# Patient Record
Sex: FEMALE | URBAN - METROPOLITAN AREA
[De-identification: names, ages, dates, MRNs, and addresses within clinical notes are randomized per-mention and may not be internally consistent; named-entity substitution may affect disease eponyms.]

---

## 2018-10-18 ENCOUNTER — OFFICE VISIT (OUTPATIENT)
Dept: URBAN - METROPOLITAN AREA CLINIC 62 | Facility: CLINIC | Age: 73
End: 2018-10-18
Payer: MEDICARE

## 2018-10-18 DIAGNOSIS — H00.12 CHALAZION RIGHT LOWER EYELID: Primary | ICD-10-CM

## 2018-10-18 PROCEDURE — 92012 INTRM OPH EXAM EST PATIENT: CPT | Performed by: OPTOMETRIST

## 2018-10-18 ASSESSMENT — INTRAOCULAR PRESSURE
OD: 15
OS: 13

## 2018-10-18 NOTE — IMPRESSION/PLAN
Impression: Chalazion right lower eyelid: H00.12. Plan: Discussed diagnosis in detail with patient. Reassured patient of current condition and treatment. Patient instructed to apply warm compresses QID for 15 mins for 1 week. Lid scrubs and hygiene were explained. Return to clinic if unresolved.

## 2019-07-29 ENCOUNTER — OFFICE VISIT (OUTPATIENT)
Dept: URBAN - METROPOLITAN AREA CLINIC 62 | Facility: CLINIC | Age: 74
End: 2019-07-29
Payer: MEDICARE

## 2019-07-29 DIAGNOSIS — H52.4 PRESBYOPIA: ICD-10-CM

## 2019-07-29 DIAGNOSIS — H43.813 VITREOUS DEGENERATION, BILATERAL: ICD-10-CM

## 2019-07-29 PROCEDURE — 92014 COMPRE OPH EXAM EST PT 1/>: CPT | Performed by: OPTOMETRIST

## 2019-07-29 ASSESSMENT — KERATOMETRY
OD: 41.38
OS: 41.38

## 2019-07-29 ASSESSMENT — INTRAOCULAR PRESSURE
OS: 15
OD: 15

## 2019-07-29 ASSESSMENT — VISUAL ACUITY: OS: 20/30

## 2020-11-06 ENCOUNTER — OFFICE VISIT (OUTPATIENT)
Dept: URBAN - METROPOLITAN AREA CLINIC 62 | Facility: CLINIC | Age: 75
End: 2020-11-06
Payer: MEDICARE

## 2020-11-06 DIAGNOSIS — H25.813 COMBINED FORMS OF AGE-RELATED CATARACT, BILATERAL: Primary | ICD-10-CM

## 2020-11-06 PROCEDURE — 92014 COMPRE OPH EXAM EST PT 1/>: CPT | Performed by: OPTOMETRIST

## 2020-11-06 ASSESSMENT — VISUAL ACUITY
OS: 20/30
OD: 20/30

## 2020-11-06 ASSESSMENT — KERATOMETRY
OS: 41.63
OD: 41.50

## 2020-11-06 NOTE — IMPRESSION/PLAN
Impression: Combined forms of age-related cataract, bilateral: H25.813. Plan: Cataracts account for the patient's complaints. Discussed all risks, benefits, procedures and recovery. Patient understands changing glasses will not improve vision. Patient desires to have surgery and referred to Dr. Peggy Barragan for phacoemulsification with intraocular lens.

## 2020-11-11 ENCOUNTER — OFFICE VISIT (OUTPATIENT)
Dept: URBAN - METROPOLITAN AREA CLINIC 62 | Facility: CLINIC | Age: 75
End: 2020-11-11
Payer: MEDICARE

## 2020-11-11 PROCEDURE — 99204 OFFICE O/P NEW MOD 45 MIN: CPT | Performed by: OPHTHALMOLOGY

## 2020-11-11 ASSESSMENT — KERATOMETRY
OS: 41.13
OD: 41.50

## 2020-11-11 ASSESSMENT — INTRAOCULAR PRESSURE
OS: 15
OD: 16

## 2020-11-11 NOTE — IMPRESSION/PLAN
Impression: Combined forms of age-related cataract, bilateral: H25.813. Plan: Cataract accounts for pt complaints. Pt desires sx. Schedule CE/IOL both eyes, left then right. Risk/Benefits/Alternatives discussed with patient. Rec. mono-focal vs Toric. Consider ORA/LRI. RL2. Target distance. Combination drops. Order a-scan. Rec. Intra-ocular cefuroxime to decrease the risk of endophthalmitis.

## 2020-11-18 ENCOUNTER — TESTING ONLY (OUTPATIENT)
Dept: URBAN - METROPOLITAN AREA CLINIC 62 | Facility: CLINIC | Age: 75
End: 2020-11-18
Payer: MEDICARE

## 2020-11-18 ASSESSMENT — PACHYMETRY
OD: 25.70
OS: 25.67
OS: 3.67
OD: 3.38

## 2020-12-10 ENCOUNTER — SURGERY (OUTPATIENT)
Dept: URBAN - METROPOLITAN AREA SURGERY 40 | Facility: SURGERY | Age: 75
End: 2020-12-10
Payer: MEDICARE

## 2020-12-10 ENCOUNTER — Encounter (OUTPATIENT)
Dept: URBAN - METROPOLITAN AREA SURGERY 39 | Facility: SURGERY | Age: 75
End: 2020-12-10
Payer: MEDICARE

## 2020-12-10 PROCEDURE — G8918 PT W/O PREOP ORDER IV AB PRO: HCPCS | Performed by: CLINIC/CENTER

## 2020-12-10 PROCEDURE — G8907 PT DOC NO EVENTS ON DISCHARG: HCPCS | Performed by: CLINIC/CENTER

## 2020-12-10 PROCEDURE — 66984 XCAPSL CTRC RMVL W/O ECP: CPT | Performed by: OPHTHALMOLOGY

## 2020-12-11 ENCOUNTER — POST-OPERATIVE VISIT (OUTPATIENT)
Dept: URBAN - METROPOLITAN AREA CLINIC 62 | Facility: CLINIC | Age: 75
End: 2020-12-11
Payer: MEDICARE

## 2020-12-11 DIAGNOSIS — Z48.810 ENCOUNTER FOR SURGICAL AFTERCARE FOLLOWING SURGERY ON THE SENSE ORGANS: Primary | ICD-10-CM

## 2020-12-11 PROCEDURE — 99024 POSTOP FOLLOW-UP VISIT: CPT | Performed by: OPTOMETRIST

## 2020-12-11 ASSESSMENT — INTRAOCULAR PRESSURE: OD: 21

## 2020-12-11 NOTE — IMPRESSION/PLAN
Impression: S/P Cataract Extraction by phacoemulsification with IOL placement; ORA/LRI OD - 1 Day. Encounter for surgical aftercare following surgery on a sense organ  Z48.810. Plan: RTC 2nd eye surgery OS RL2.  Combo Qid Od

## 2020-12-16 ENCOUNTER — Encounter (OUTPATIENT)
Dept: URBAN - METROPOLITAN AREA SURGERY 39 | Facility: SURGERY | Age: 75
End: 2020-12-16
Payer: MEDICARE

## 2020-12-16 ENCOUNTER — SURGERY (OUTPATIENT)
Dept: URBAN - METROPOLITAN AREA SURGERY 40 | Facility: SURGERY | Age: 75
End: 2020-12-16
Payer: MEDICARE

## 2020-12-16 PROCEDURE — G8918 PT W/O PREOP ORDER IV AB PRO: HCPCS | Performed by: CLINIC/CENTER

## 2020-12-16 PROCEDURE — G8907 PT DOC NO EVENTS ON DISCHARG: HCPCS | Performed by: CLINIC/CENTER

## 2020-12-16 PROCEDURE — 66984 XCAPSL CTRC RMVL W/O ECP: CPT | Performed by: OPHTHALMOLOGY

## 2020-12-17 ENCOUNTER — POST-OPERATIVE VISIT (OUTPATIENT)
Dept: URBAN - METROPOLITAN AREA CLINIC 62 | Facility: CLINIC | Age: 75
End: 2020-12-17
Payer: MEDICARE

## 2020-12-17 PROCEDURE — 99024 POSTOP FOLLOW-UP VISIT: CPT | Performed by: OPTOMETRIST

## 2020-12-17 ASSESSMENT — INTRAOCULAR PRESSURE
OD: 18
OS: 22

## 2020-12-17 NOTE — IMPRESSION/PLAN
Impression:  Presence of intraocular lens  Z96.1.  Plan: RTC 1 week PO2 Combination drops 4xs a day on OS eye and 3xs a day on OS

## 2020-12-23 ENCOUNTER — POST-OPERATIVE VISIT (OUTPATIENT)
Dept: URBAN - METROPOLITAN AREA CLINIC 62 | Facility: CLINIC | Age: 75
End: 2020-12-23
Payer: MEDICARE

## 2020-12-23 DIAGNOSIS — Z96.1 PRESENCE OF INTRAOCULAR LENS: Primary | ICD-10-CM

## 2020-12-23 PROCEDURE — 99024 POSTOP FOLLOW-UP VISIT: CPT | Performed by: OPTOMETRIST

## 2020-12-23 ASSESSMENT — VISUAL ACUITY
OD: 20/25
OS: 20/25

## 2020-12-23 ASSESSMENT — INTRAOCULAR PRESSURE
OS: 16
OD: 16

## 2020-12-23 NOTE — IMPRESSION/PLAN
Impression: S/P Cataract Extraction by phacoemulsification with IOL placement; ORA/LRI OS - 7 Days. Presence of intraocular lens  Z96.1.  Excellent post op course   Post operative instructions reviewed - Plan: RTC 3 week PO3 OS combo drops TID OS and BID OD

## 2021-01-15 ENCOUNTER — POST-OPERATIVE VISIT (OUTPATIENT)
Dept: URBAN - METROPOLITAN AREA CLINIC 62 | Facility: CLINIC | Age: 76
End: 2021-01-15
Payer: MEDICARE

## 2021-01-15 PROCEDURE — 99024 POSTOP FOLLOW-UP VISIT: CPT | Performed by: OPTOMETRIST

## 2021-01-15 ASSESSMENT — VISUAL ACUITY
OD: 20/20
OS: 20/20

## 2021-01-15 ASSESSMENT — INTRAOCULAR PRESSURE
OS: 11
OD: 11

## 2021-01-15 NOTE — IMPRESSION/PLAN
Impression: S/P Cataract Extraction by phacoemulsification with IOL placement; ORA/LRI OS - 30 Days. Presence of intraocular lens  Z96.1. Plan: Manifest done today.  finished all drops as prescribed

## 2021-06-03 ENCOUNTER — OFFICE VISIT (OUTPATIENT)
Dept: URBAN - METROPOLITAN AREA CLINIC 63 | Facility: CLINIC | Age: 76
End: 2021-06-03
Payer: MEDICARE

## 2021-06-03 DIAGNOSIS — H04.123 DRY EYE SYNDROME OF BILATERAL LACRIMAL GLANDS: Primary | Chronic | ICD-10-CM

## 2021-06-03 PROCEDURE — 92014 COMPRE OPH EXAM EST PT 1/>: CPT | Performed by: OPTOMETRIST

## 2021-06-03 ASSESSMENT — INTRAOCULAR PRESSURE
OD: 13
OS: 13

## 2022-06-24 ENCOUNTER — OFFICE VISIT (OUTPATIENT)
Dept: URBAN - METROPOLITAN AREA CLINIC 63 | Facility: CLINIC | Age: 77
End: 2022-06-24
Payer: MEDICARE

## 2022-06-24 DIAGNOSIS — H04.123 DRY EYE SYNDROME OF BILATERAL LACRIMAL GLANDS: Primary | ICD-10-CM

## 2022-06-24 DIAGNOSIS — H43.813 VITREOUS DEGENERATION, BILATERAL: ICD-10-CM

## 2022-06-24 DIAGNOSIS — H26.492 OTHER SECONDARY CATARACT, LEFT EYE: ICD-10-CM

## 2022-06-24 DIAGNOSIS — Z96.1 PRESENCE OF PSEUDOPHAKIA: ICD-10-CM

## 2022-06-24 DIAGNOSIS — H52.4 PRESBYOPIA: ICD-10-CM

## 2022-06-24 PROCEDURE — 92014 COMPRE OPH EXAM EST PT 1/>: CPT | Performed by: OPTOMETRIST

## 2022-06-24 ASSESSMENT — VISUAL ACUITY
OS: 20/20
OD: 20/25

## 2022-06-24 ASSESSMENT — INTRAOCULAR PRESSURE
OD: 12
OS: 15

## 2022-06-24 ASSESSMENT — KERATOMETRY
OD: 41.75
OS: 41.50

## 2023-07-13 ENCOUNTER — OFFICE VISIT (OUTPATIENT)
Dept: URBAN - METROPOLITAN AREA CLINIC 63 | Facility: CLINIC | Age: 78
End: 2023-07-13
Payer: MEDICARE

## 2023-07-13 DIAGNOSIS — H43.813 VITREOUS DEGENERATION, BILATERAL: ICD-10-CM

## 2023-07-13 DIAGNOSIS — H04.123 DRY EYE SYNDROME OF BILATERAL LACRIMAL GLANDS: Primary | ICD-10-CM

## 2023-07-13 DIAGNOSIS — H26.492 OTHER SECONDARY CATARACT, LEFT EYE: ICD-10-CM

## 2023-07-13 DIAGNOSIS — Z96.1 PRESENCE OF PSEUDOPHAKIA: ICD-10-CM

## 2023-07-13 DIAGNOSIS — H52.13 MYOPIA, BILATERAL: ICD-10-CM

## 2023-07-13 DIAGNOSIS — H52.4 PRESBYOPIA: ICD-10-CM

## 2023-07-13 PROCEDURE — 92014 COMPRE OPH EXAM EST PT 1/>: CPT | Performed by: OPTOMETRIST

## 2023-07-13 ASSESSMENT — KERATOMETRY
OD: 41.38
OS: 41.50

## 2023-07-13 ASSESSMENT — INTRAOCULAR PRESSURE
OD: 14
OS: 14

## 2023-07-13 ASSESSMENT — VISUAL ACUITY
OD: 20/25
OS: 20/25

## 2023-07-13 NOTE — IMPRESSION/PLAN
Impression: Presbyopia: H52.4. Plan: Give Rx for PAL glass and instructed on normal adaptation period.

## 2023-08-20 NOTE — IMPRESSION/PLAN
Impression: Vitreous degeneration, bilateral: H43.813. Plan: Posterior vitreous detachment accounts for the patient's complaints. There is no evidence of retinal pathology. All signs and risks of retinal detachment and tears were discussed in detail. The possibility of vitreoretinal traction was explained and patient instructed to call the office immediately if any new symptoms noted or symptoms change. [Regular Cycle Intervals] : periods have been irregular [Menarche Age: ____] : age at menarche was [unfilled] [___] : Full Term: [unfilled]

## 2024-07-16 ENCOUNTER — OFFICE VISIT (OUTPATIENT)
Dept: URBAN - METROPOLITAN AREA CLINIC 63 | Facility: CLINIC | Age: 79
End: 2024-07-16
Payer: MEDICARE

## 2024-07-16 DIAGNOSIS — Z96.1 PRESENCE OF INTRAOCULAR LENS: ICD-10-CM

## 2024-07-16 DIAGNOSIS — H52.13 MYOPIA, BILATERAL: ICD-10-CM

## 2024-07-16 DIAGNOSIS — H26.493 OTHER SECONDARY CATARACT, BILATERAL: Primary | ICD-10-CM

## 2024-07-16 DIAGNOSIS — H43.813 VITREOUS DEGENERATION, BILATERAL: ICD-10-CM

## 2024-07-16 PROCEDURE — 92014 COMPRE OPH EXAM EST PT 1/>: CPT | Performed by: OPTOMETRIST

## 2024-07-16 ASSESSMENT — VISUAL ACUITY
OD: 20/20
OS: 20/20

## 2024-07-16 ASSESSMENT — KERATOMETRY
OD: 42.38
OS: 41.50